# Patient Record
Sex: FEMALE | Race: WHITE
[De-identification: names, ages, dates, MRNs, and addresses within clinical notes are randomized per-mention and may not be internally consistent; named-entity substitution may affect disease eponyms.]

---

## 2021-02-21 ENCOUNTER — HOSPITAL ENCOUNTER (EMERGENCY)
Dept: HOSPITAL 26 - MED | Age: 26
Discharge: HOME | End: 2021-02-21
Payer: MEDICAID

## 2021-02-21 VITALS — WEIGHT: 120 LBS | BODY MASS INDEX: 17.77 KG/M2 | HEIGHT: 69 IN

## 2021-02-21 VITALS — DIASTOLIC BLOOD PRESSURE: 68 MMHG | SYSTOLIC BLOOD PRESSURE: 100 MMHG

## 2021-02-21 VITALS — SYSTOLIC BLOOD PRESSURE: 100 MMHG | DIASTOLIC BLOOD PRESSURE: 68 MMHG

## 2021-02-21 DIAGNOSIS — A05.9: Primary | ICD-10-CM

## 2021-02-21 DIAGNOSIS — F12.10: ICD-10-CM

## 2021-02-21 DIAGNOSIS — Z88.1: ICD-10-CM

## 2021-02-21 DIAGNOSIS — J45.909: ICD-10-CM

## 2021-02-21 NOTE — NUR
PT COMING IN FOR OVERDOSE ON MUSHROOMS, PT STATED "I USUALLY TAKE 3 GRAMS AND 
TONIGHT I TOOK 6 GRAMS".  DENIES ANY ATTEMPT TO HARM HERSELF.  PT BECAME 
ANXIOUS AND FEARFUL AND THOUGHT SHE WAS GOING TO DIE SO SHE CALLED 911.  PT 
CALM AND COOPERATIVE AT THIS TIME, A/O X 4.  PT PLACED IN BED, BED IN LOWEST 
POSITION AND SIDERAIL UP X 1.



HX - ASTHMA

NKA